# Patient Record
Sex: FEMALE | Race: WHITE | NOT HISPANIC OR LATINO | URBAN - NONMETROPOLITAN AREA
[De-identification: names, ages, dates, MRNs, and addresses within clinical notes are randomized per-mention and may not be internally consistent; named-entity substitution may affect disease eponyms.]

---

## 2018-06-10 ENCOUNTER — OFFICE VISIT (OUTPATIENT)
Dept: RETAIL CLINIC | Facility: CLINIC | Age: 23
End: 2018-06-10

## 2018-06-10 VITALS — RESPIRATION RATE: 16 BRPM | WEIGHT: 137 LBS | OXYGEN SATURATION: 99 % | TEMPERATURE: 99.9 F | HEART RATE: 99 BPM

## 2018-06-10 DIAGNOSIS — S70.361A INSECT BITE (NONVENOMOUS), RIGHT THIGH, INITIAL ENCOUNTER: Primary | ICD-10-CM
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
## 2018-06-10 PROCEDURE — 99202 OFFICE O/P NEW SF 15 MIN: CPT | Performed by: NURSE PRACTITIONER

## 2018-06-10 RX ORDER — MUPIROCIN CALCIUM 20 MG/G
CREAM TOPICAL 3 TIMES DAILY
Qty: 1 EACH | Refills: 0 | Status: SHIPPED | OUTPATIENT
Start: 2018-06-10 | End: 2018-06-20

## 2018-06-10 RX ORDER — CETIRIZINE HYDROCHLORIDE 10 MG/1
10 TABLET ORAL DAILY
COMMUNITY

## 2018-06-10 NOTE — PATIENT INSTRUCTIONS
Insect Bite, Adult  An insect bite can make your skin red, itchy, and swollen. An insect bite is different from an insect sting, which happens when an insect injects poison (venom) into the skin.  Some insects can spread disease to people through a bite. However, most insect bites do not lead to disease and are not serious.  What are the causes?  Insects may bite for a variety of reasons, including:  · Hunger.  · To defend themselves.  Insects that bite include:  · Spiders.  · Mosquitoes.  · Ticks.  · Fleas.  · Ants.  · Flies.  · Bedbugs.  What are the signs or symptoms?  Symptoms of this condition include:  · Itching or pain in the bite area.  · Redness and swelling in the bite area.  · An open wound (skin ulcer).  In many cases, symptoms last for 2-4 days.  How is this diagnosed?  This condition is usually diagnosed based on symptoms and a physical exam.  How is this treated?  Treatment is usually not needed. Symptoms often go away on their own. When treatment is recommended, it may involve:  · Applying a cream or lotion to the bitten area. This treatment helps with itching.  · Taking an antibiotic medicine. This treatment is needed if the bite area gets infected.  · Getting a tetanus shot.  · Applying ice to the affected area.  · Medicines called antihistamines. This treatment is needed if you develop an allergic reaction to the insect bite.  Follow these instructions at home:  Bite area care   · Do not scratch the bite area.  · Keep the bite area clean and dry. Wash it every day with soap and water as told by your health care provider.  · Check the bite area every day for signs of infection. Check for:  ¨ More redness, swelling, or pain.  ¨ Fluid or blood.  ¨ Warmth.  ¨ Pus.  Managing pain, itching, and swelling     · You may apply a baking soda paste, cortisone cream, or calamine lotion to the bite area as told by your health care provider.  · If directed, apply ice to the bite area.  ¨ Put ice in a plastic  bag.  ¨ Place a towel between your skin and the bag.  ¨ Leave the ice on for 20 minutes, 2-3 times per day.  Medicines   · Apply or take over-the-counter and prescription medicines only as told by your health care provider.  · If you were prescribed an antibiotic medicine, use it as told by your health care provider. Do not stop using the antibiotic even if your condition improves.  General instructions   · Keep all follow-up visits as told by your health care provider. This is important.  How is this prevented?  To help reduce your risk of insect bites:  · When you are outdoors, wear clothing that covers your arms and legs.  · Use insect repellent. The best insect repellents contain:  ¨ DEET, picaridin, oil of lemon eucalyptus (OLE), or RV7420.  ¨ Higher amounts of an active ingredient.  · If your home windows do not have screens, consider installing them.  Contact a health care provider if:  · You have more redness, swelling, or pain in the bite area.  · You have fluid, blood, or pus coming from the bite area.  · The bite area feels warm to the touch.  · You have a fever.  Get help right away if:  · You have joint pain.  · You have a rash.  · You have shortness of breath.  · You feel unusually tired or sleepy.  · You have neck pain.  · You have a headache.  · You have unusual weakness.  · You have chest pain.  · You have nausea, vomiting, or pain in the abdomen.  This information is not intended to replace advice given to you by your health care provider. Make sure you discuss any questions you have with your health care provider.  Document Released: 01/25/2006 Document Revised: 08/16/2017 Document Reviewed: 06/26/2017  [employer] Interactive Patient Education © 2017 [employer]    Spider Bite  Spider bites are not common. When spider bites do happen, most do not cause serious health problems. There are only a few types of spider bites that can cause serious health problems.  What are the causes?  A spider bite  usually happens when a person accidentally makes contact with a spider in a way that traps the spider against the person’s skin.  What are the signs or symptoms?  Symptoms may vary depending on the type of spider. Some spider bites may cause symptoms within 1 hour after the bite. For other spider bites, it may take 1-2 days for symptoms to develop. Common symptoms include:  · Redness and swelling in the area of the bite.  · Discomfort or pain in the area of the bite.  A few types of spiders, such as the black  spider or the brown recluse spider, can inject poison (venom) into a bite wound. This venom causes more serious symptoms. Symptoms of a venomous spider bite vary, and may include:  · Muscle cramps.  · Nausea, vomiting, or abdominal pain.  · Fever.  · A skin sore (lesion) that spreads. This can break into an open wound (skin ulcer).  · Light-headedness or dizziness.  How is this diagnosed?  This condition may be diagnosed based on your symptoms and a physical exam. Your health care provider will ask about the history of your injury and any details you may have about the spider. This may help to determine what type of spider it was that bit you.  How is this treated?  Many spider bites do not require treatment. If needed, treatment may include:  · Icing and keeping the bite area raised (elevated).  · Over-the-counter or prescription medicines to help control symptoms.  · A tetanus shot.  · Antibiotic medicines.  Follow these instructions at home:  Medicines   · Take or apply over-the-counter and prescription medicines only as told by your health care provider.  · If you were prescribed an antibiotic medicine, take or apply it as told by your health care provider. Do not stop using the antibiotic even if your condition improves.  General instructions   · Do not scratch the bite area.  · Keep the bite area clean and dry. Wash the bite area daily with soap and water as told by your health care provider.  · If  directed, apply ice to the bite area.  ¨ Put ice in a plastic bag.  ¨ Place a towel between your skin and the bag.  ¨ Leave the ice on for 20 minutes, 2-3 times per day.  · Elevate the affected area above the level of your heart while you are sitting or lying down, if possible.  · Keep all follow-up visits as told by your health care provider. This is important.  Contact a health care provider if:  · Your bite does not get better after 3 days of treatment.  · Your bite turns black or purple.  · You have increased redness, swelling, or pain at the site of the bite.  Get help right away if:  · You develop shortness of breath or chest pain.  · You have fluid, blood, or pus coming from the bite area.  · You have muscle cramps or painful muscle spasms.  · You develop abdominal pain, nausea, or vomiting.  · You feel unusually tired (fatigued) or sleepy.  This information is not intended to replace advice given to you by your health care provider. Make sure you discuss any questions you have with your health care provider.  Document Released: 01/25/2006 Document Revised: 08/14/2017 Document Reviewed: 05/04/2016  [employer] Interactive Patient Education © 2017 [employer]

## 2018-06-10 NOTE — PROGRESS NOTES
"Subjective   Martine Goetz is a 22 y.o. female.     \"I think I was bitten by a spider about 2 weeks ago; it just won't heal and now it looks a little black.\"      Insect Bite   This is a new problem. The current episode started 1 to 4 weeks ago. The problem occurs constantly. The problem has been gradually worsening. Pertinent negatives include no arthralgias, chills, fatigue, fever, headaches, joint swelling, myalgias, neck pain or swollen glands. Nothing aggravates the symptoms. Treatments tried: \"I've just tried to leave it alone\" The treatment provided no relief.        The following portions of the patient's history were reviewed and updated as appropriate: allergies, current medications, past family history, past medical history, past social history, past surgical history and problem list.    Review of Systems   Constitutional: Negative for chills, fatigue and fever.   Musculoskeletal: Negative for arthralgias, joint swelling, myalgias, neck pain and neck stiffness.   Skin: Positive for color change (over site of bite).       Objective      Pulse 99   Temp 99.9 °F (37.7 °C) (Tympanic)   Resp 16   Wt 62.1 kg (137 lb)   LMP  (Within Weeks)   SpO2 99%       Physical Exam   Constitutional: She is oriented to person, place, and time. She appears well-developed and well-nourished. No distress.   Eyes: Conjunctivae and EOM are normal. Pupils are equal, round, and reactive to light.   Neck: Normal range of motion. Neck supple.   Musculoskeletal: Normal range of motion. She exhibits no tenderness.   Neurological: She is alert and oriented to person, place, and time. No cranial nerve deficit.   Skin: Skin is warm and dry. Capillary refill takes less than 2 seconds. Lesion noted.        Psychiatric: She has a normal mood and affect. Her behavior is normal. Judgment and thought content normal.         Assessment/Plan   Martine was seen today for insect bite.    Diagnoses and all orders for this visit:    Insect bite " (nonvenomous), right thigh, initial encounter    Other orders  -     mupirocin (BACTROBAN) 2 % cream; Apply  topically 3 (Three) Times a Day for 10 days.      As with any non-healing wound, follow up with PCP if worsens or fails to fully resolve.    Dayana Burroughs, APRN